# Patient Record
Sex: FEMALE | Race: WHITE | NOT HISPANIC OR LATINO | Employment: PART TIME | URBAN - METROPOLITAN AREA
[De-identification: names, ages, dates, MRNs, and addresses within clinical notes are randomized per-mention and may not be internally consistent; named-entity substitution may affect disease eponyms.]

---

## 2019-07-10 ENCOUNTER — TELEPHONE (OUTPATIENT)
Dept: NEUROLOGY | Facility: CLINIC | Age: 69
End: 2019-07-10

## 2019-07-10 NOTE — TELEPHONE ENCOUNTER
----- Message from Gerda Lambert sent at 7/10/2019 11:22 AM CDT -----  Contact: patient  Patient called to schedule an appointment specifically with Dr Long  And wishes to speak with a nurse regarding this matter.       she can be reached at [phone]    Thanks  KB

## 2019-07-10 NOTE — TELEPHONE ENCOUNTER
Returned pt call to discuss message; pt reporting multitude of symptoms include BLE pain with numbness and tingling to feet; notified that referral not received nor in system at this time but would contact her when available. Verbalized understanding.

## 2019-08-07 ENCOUNTER — TELEPHONE (OUTPATIENT)
Dept: NEUROLOGY | Facility: CLINIC | Age: 69
End: 2019-08-07

## 2019-08-07 NOTE — TELEPHONE ENCOUNTER
Returned pt call and scheduled appt from referral with Dr. Batista from Dr. Martínez for idiopathic progressive neuropathy; instructed pt to obtain all outside records and bring to appt including imaging on disc if possible; date/time/location confirmed; verbalized understanding; appt slip mailed.

## 2019-08-07 NOTE — TELEPHONE ENCOUNTER
----- Message from Chari Read sent at 8/6/2019  2:37 PM CDT -----  Contact: Marilin orr  Type: Needs Medical Advice    Who Called:  Marilin Malloy Call Back Number: [phone]  Additional Information: Pt's  faxed a referral for pt to see Ashley Batista. The referral was sent over last Thursday. Pls call pt to adv of a status.

## 2019-09-23 ENCOUNTER — LAB VISIT (OUTPATIENT)
Dept: LAB | Facility: HOSPITAL | Age: 69
End: 2019-09-23
Attending: PSYCHIATRY & NEUROLOGY
Payer: MEDICARE

## 2019-09-23 ENCOUNTER — OFFICE VISIT (OUTPATIENT)
Dept: NEUROLOGY | Facility: CLINIC | Age: 69
End: 2019-09-23
Payer: MEDICARE

## 2019-09-23 VITALS
RESPIRATION RATE: 20 BRPM | DIASTOLIC BLOOD PRESSURE: 86 MMHG | HEIGHT: 66 IN | HEART RATE: 67 BPM | BODY MASS INDEX: 24.98 KG/M2 | WEIGHT: 155.44 LBS | SYSTOLIC BLOOD PRESSURE: 161 MMHG

## 2019-09-23 DIAGNOSIS — M79.605 LEFT LEG PAIN: ICD-10-CM

## 2019-09-23 DIAGNOSIS — M79.605 LEFT LEG PAIN: Primary | ICD-10-CM

## 2019-09-23 DIAGNOSIS — G60.9 IDIOPATHIC SMALL FIBER PERIPHERAL NEUROPATHY: ICD-10-CM

## 2019-09-23 DIAGNOSIS — R22.2 SUPRACLAVICULAR MASS: ICD-10-CM

## 2019-09-23 DIAGNOSIS — M51.36 DEGENERATIVE DISC DISEASE, LUMBAR: ICD-10-CM

## 2019-09-23 DIAGNOSIS — R22.1 NECK MASS: ICD-10-CM

## 2019-09-23 LAB
ALBUMIN SERPL BCP-MCNC: 4 G/DL (ref 3.5–5.2)
ALP SERPL-CCNC: 58 U/L (ref 55–135)
ALT SERPL W/O P-5'-P-CCNC: 20 U/L (ref 10–44)
ANION GAP SERPL CALC-SCNC: 12 MMOL/L (ref 8–16)
AST SERPL-CCNC: 24 U/L (ref 10–40)
BILIRUB SERPL-MCNC: 0.7 MG/DL (ref 0.1–1)
BUN SERPL-MCNC: 14 MG/DL (ref 8–23)
CALCIUM SERPL-MCNC: 9.3 MG/DL (ref 8.7–10.5)
CHLORIDE SERPL-SCNC: 105 MMOL/L (ref 95–110)
CK SERPL-CCNC: 55 U/L (ref 20–180)
CO2 SERPL-SCNC: 20 MMOL/L (ref 23–29)
CREAT SERPL-MCNC: 0.6 MG/DL (ref 0.5–1.4)
CRP SERPL-MCNC: 0.13 MG/DL (ref 0–0.75)
ERYTHROCYTE [SEDIMENTATION RATE] IN BLOOD BY WESTERGREN METHOD: 11 MM/HR (ref 0–20)
EST. GFR  (AFRICAN AMERICAN): >60 ML/MIN/1.73 M^2
EST. GFR  (NON AFRICAN AMERICAN): >60 ML/MIN/1.73 M^2
GLUCOSE SERPL-MCNC: 147 MG/DL (ref 70–110)
POTASSIUM SERPL-SCNC: 3.3 MMOL/L (ref 3.5–5.1)
PROT SERPL-MCNC: 7.2 G/DL (ref 6–8.4)
SODIUM SERPL-SCNC: 137 MMOL/L (ref 136–145)

## 2019-09-23 PROCEDURE — 99205 PR OFFICE/OUTPT VISIT, NEW, LEVL V, 60-74 MIN: ICD-10-PCS | Mod: S$PBB,,, | Performed by: PSYCHIATRY & NEUROLOGY

## 2019-09-23 PROCEDURE — 99999 PR PBB SHADOW E&M-EST. PATIENT-LVL IV: ICD-10-PCS | Mod: PBBFAC,,, | Performed by: PSYCHIATRY & NEUROLOGY

## 2019-09-23 PROCEDURE — 86038 ANTINUCLEAR ANTIBODIES: CPT

## 2019-09-23 PROCEDURE — 99214 OFFICE O/P EST MOD 30 MIN: CPT | Mod: PBBFAC,PN | Performed by: PSYCHIATRY & NEUROLOGY

## 2019-09-23 PROCEDURE — 83516 IMMUNOASSAY NONANTIBODY: CPT

## 2019-09-23 PROCEDURE — 86255 FLUORESCENT ANTIBODY SCREEN: CPT | Mod: 91

## 2019-09-23 PROCEDURE — 99999 PR PBB SHADOW E&M-EST. PATIENT-LVL IV: CPT | Mod: PBBFAC,,, | Performed by: PSYCHIATRY & NEUROLOGY

## 2019-09-23 PROCEDURE — 85651 RBC SED RATE NONAUTOMATED: CPT

## 2019-09-23 PROCEDURE — 80053 COMPREHEN METABOLIC PANEL: CPT

## 2019-09-23 PROCEDURE — 36415 COLL VENOUS BLD VENIPUNCTURE: CPT

## 2019-09-23 PROCEDURE — 86140 C-REACTIVE PROTEIN: CPT

## 2019-09-23 PROCEDURE — 82550 ASSAY OF CK (CPK): CPT

## 2019-09-23 PROCEDURE — 99205 OFFICE O/P NEW HI 60 MIN: CPT | Mod: S$PBB,,, | Performed by: PSYCHIATRY & NEUROLOGY

## 2019-09-23 PROCEDURE — 82085 ASSAY OF ALDOLASE: CPT

## 2019-09-23 RX ORDER — MIRABEGRON 50 MG/1
1 TABLET, FILM COATED, EXTENDED RELEASE ORAL DAILY
Refills: 11 | COMMUNITY
Start: 2019-07-01

## 2019-09-23 RX ORDER — IBUPROFEN 800 MG/1
800 TABLET ORAL EVERY 8 HOURS PRN
COMMUNITY

## 2019-09-23 RX ORDER — CHOLECALCIFEROL (VITAMIN D3) 25 MCG
1000 TABLET ORAL DAILY
COMMUNITY

## 2019-09-23 NOTE — PATIENT INSTRUCTIONS
Will get labs regarding left leg pain and your neuropathy.    Will get an EMG to evaluate your more recent leg pain.    Will get a CT of the neck to look at the fullness you have on the left side.

## 2019-09-23 NOTE — LETTER
September 25, 2019      Leonard Martínez MD  [address]  [place] [address]           [facility]  [address] [place] [address]  Phone: [phone]  Fax: [phone]          Patient: Marilin Betts   MR Number: [id number]   YOB: 1950   Date of Visit: 9/23/2019       Dear Dr. Leonard Martínez:    Thank you for referring Marilin Betts to me for evaluation. Attached you will find relevant portions of my assessment and plan of care.    If you have questions, please do not hesitate to call me. I look forward to following Marilin Betts along with you.    Sincerely,    Ashley Batista, DO    Enclosure  CC:  No Recipients    If you would like to receive this communication electronically, please contact [email or web address] or [phone] to request more information on [facility] access.    For providers and/or their staff who would like to refer a patient to [facility], please contact us through our one-stop-shop provider referral line, [facility], at [phone].    If you feel you have received this communication in error or would no longer like to receive these types of communications, please e-mail [email or web address]

## 2019-09-23 NOTE — PROGRESS NOTES
NEUROLOGY  Outpatient CONSULT    [facility]  [address], [place] [address]  [phone] (office) / [phone] (fax)    Patient Name:  Marilin Betts  :  1950  MR #:  [id number]  Acct #:  [id number]    Date of Neurology Consult: 2019  Name of Neurologist: Ashley Batista D.O, ABPN, AOBNP, ABEM    Other Physicians:  Leonard Martínez MD (Primary Care Physician); Leonard Martínez MD (Referring)      Chief Complaint: Extremity Weakness and Peripheral Neuropathy      History of Present Illness (HPI):  Marilin Betts is a 68 y.o. female referred by her primary physician for consultation regarding neuropathy.    She has a long history of spastic bladder. She was diagnosed with an autonomic nerve disorder in her 30s.  She was told it was a neurogenic bladder.  She also had bowel dysfunction.  She states then about 5 years ago she would get transient muscle weakness in her legs.  She described it as a strong, aching pain.  It would make her leg collapse and fall.  She saw a doctor for leg weakness, blurred vision, bowel and bladder issues. She was tested for MS and had an MRI that was clean.  Since then she has had many other tests to rule out other things.  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  She was seen by a hematologist for this.  They did not feel this was symptomatic.  She then had a skin biopsy that showed she had small fiber neuropathy.  Then she had another MRI of her low back and tried epidural injections.  These gave her about 3 weeks of relief for upper leg pain.  She was recently seen by orthopedics who found some hip arthritis, but nothing severe.  A repeat course of epidural injections did not provide any further relief.  She was seen in [place] and had an MRI brain and EMG ordered.  She cancelled those when she got this appointment.    It used to be worse in her right leg, but now her left leg is worse.  She states that she will be walking and one of  her knees will give out.  She typically can catch herself.  She has never been hurt with a fall, but has fallen.    She has a burning sensation in just the left foot that comes and goes.  This is maybe once a month for a few hours.    Her pain goes up and down her left leg, she does not currently have this in the right leg.  The pain will generally shoot through her thigh or her calf.  She has never had the whole leg involved at once, generally just one area.  She has a dull ache that is constant, the shooting pains are sporadic.    Her epidural injections madsen not affect the burning in the foot.  She has not had an epidural since the radiating pain in the leg started.     She has intermittent blurred vision, glasses help.  She states she can even have double.  She has dizziness when she stands up too fast.  She has a history of fainting.  Her blood pressures will drop.  She does not perspire.    She is here today because it is getting worse, it won't go away anymore.  It has been persistently present for the last 1.5 years.  This is interfering with her life and hobbies.    She has a swelling just medial to her left clavicle.  She also has supraclavicular fullness on the left.  She states she has discomfort when leaning her head to the left.    She does not have Celiac, but has sensitivity to wheat.    Treatment to date:    N/A    Review of Systems:   General: Weight gain: No, Weight Loss: Yes, Fatigue: No,   Fever: No, Chills: No, Night Sweats: No, Insomnia: Yes, Excessive sleeping: No   Respiratory:  Cough: No, Shortness of Breath: No,   Wheezing: No, Excessive Snoring: No, Coughing up blood: No  Endocrine: Heat Intolerance: No, Cold Intolerance: No,   Excessive Thirst: Yes, Excessive Hunger: No,   Eyes:  Blurred Vision: Yes, Double Vision: Yes,   Light Sensitivity: No, Eye pain: No  Musculoskeletal: Muscle Aches/Pain: Yes, Joint Pain/Swelling: Yes, Muscle Cramps: Yes, Muscle Weakness: Yes, Neck Pain: Yes, Back  Pain: No   Neurological: Difficulty Walking/Falls: Yes, Headache Migraine: No, Dizziness/Vertigo: Yes, Fainting: No, Difficulty with Speech: No, Weakness: Yes, Tingling/Numbness: Yes, Tremors: No, Memory Problems: Yes, Seizures: No, Difficulty Swallowing: No, Altered Taste: No.  Cardiovascular: Chest Pain: No, Shortness of Breath: No,   Palpitations: No,  Gastrointestinal: Nausea/Vomiting: No, Constipation: Yes, Diarrhea: No, Bloody Stools: No   Psych/Cog:  Depression: No, Anxiety: No, Hallucinations: No, Problems Concentrating: No  : Frequent Urination: Yes, Incontinence: Yes, Blood of Urine: Yes, Urinary Infections: No  ENT:Hearing Loss: Yes, Earache: Yes, Ringing in Ears: No,   Facial Pain: No, Chronic Congestion: No   Immune: Seasonal Allergies: No, Hives and/or Rashes: No  The remainder of the review of twelve body systems was reviewed and normal.    Past Medical, Surgical, Family & Social History:   Past Medical History:   Diagnosis Date    Blurred vision     [place] (hard of hearing)     Monoclonal gammopathy     Neuropathy     Spastic neurogenic bladder      Past Surgical History:   Procedure Laterality Date    CHOLECYSTECTOMY      HYSTERECTOMY      SKIN CANCER EXCISION       Family History   Problem Relation Age of Onset    COPD Mother     Heart disease Father     Breast cancer Sister     Diabetes Sister     Hypertension Sister     Diabetes Brother     Hypertension Brother      Alcohol use:  reports that she drinks alcohol.   (Of note, 0.6 oz = 1 beer or 6 oz = 10 beers).  Tobacco use:  reports that she has quit smoking. She has never used smokeless tobacco.  Street drug use:  reports that she does not use drugs.  Allergies: Wheat containing prod.    Home Medications:     Current Outpatient Medications:     biotin 300 mcg Tab, Take 300 mcg by mouth once daily., Disp: , Rfl:     ibuprofen (ADVIL,MOTRIN) 800 MG tablet, Take 800 mg by mouth every 8 (eight) hours as needed., Disp: , Rfl:      "MYRBETRIQ 50 mg Tb24, Take 1 tablet by mouth once daily., Disp: , Rfl: 11    vitamin D (VITAMIN D3) 1000 units Tab, Take 1,000 Units by mouth once daily., Disp: , Rfl:     Physical Examination:  BP (!) 161/86   Pulse 67   Resp 20   Ht 5' 6" (1.676 m)   Wt 70.5 kg (155 lb 6.8 oz)   BMI 25.09 kg/m²     GENERAL:  General appearance: Well, non-toxic appearing.  No apparent distress.  Fundi exam: normal.  Neck: there is mass like swelling on the anterior neck just above the sternum and over the left clavicle.  Also with left supraclavicular fullness.  Carotid auscultation: normal.  Heart auscultation: normal.  Peripheral pulses: normal.  Extremities: normal.    MENTAL STATUS:  Alertness, attention span & concentration: normal.  Language: normal.  Orientation to self, place & time:  normal.  Memory, recent & remote: normal.  Fund of knowledge: normal.    SPEECH:  Clear and fluent.  Follows complex commands.    CRANIAL NERVES:  Cranial Nerves II-XII were examined.  II - Visual fields: normal.  III, IV, VI: PERRL, EOMI, No ptosis, No nystagmus.  V - Facial sensation: normal.  VII - Face symmetry & mobility: normal.  VIII - Hearing: normal.  IX, X - Palate: mobile & midline.  XI - Shoulder shrug: normal.  XII - Tongue protrusion: normal.    GROSS MOTOR:  Gait & station: antalgic gait favoring the left leg (pain in the posterior thigh and calf).  Tone: normal.  Abnormal movements: none.  Finger-nose & Heel-knee-shin: normal.  Rapid alternating movements & drift: normal.    MUSCLE STRENGTH:     Fascics Atrophy RIGHT    LEFT Atrophy Fascics     5 Neck Ext. 5       5 Neck Flex 5       5 Deltoids 5       5 Sh.Ext.Rot. 5       5 Sh.Int.Rot. 5       5 Biceps 5       5 Triceps 5       5 Forearm.Pr. 5       5 Wrist Ext. 5       5 Wrist Flex 5       5 Finger Ext. 5       5 Finger Flex 5       5 FPL 5       5 Inteross. 5                         5 Iliopsoas 4       5 Hip Abduct 5       4 Hip Adduct 4       5 Quads 5       5 Hams 5 "       5 Dorsiflex 5       5 Plantar Flex 5       5 Ankle Eliseo 5       5 Ankle Invert 5       5 Toe Ext. 5       5 Toe Flex 5                         REFLEXES:    RIGHT Reflex   LEFT   2+ Biceps 2+   2+ Brachiorad. 2+   2+ Triceps 2+        2+ Patellar 2+   2+ Ankle 2+        Down PLANTAR Down     SENSORY:  Sharp touch: reduced in fingers of right hand.  Vibration: Normal throughout.      Diagnostic Data Reviewed:   Records reviewed.    EMG 1/20/17:  This is a normal study.  There is no electrophysiologic evidence of large   fiber peripheral neuropathy. Small fiber neuropathy is not assessed by   these measures.    MRI lumbar spine 1/18/17:  FINDINGS:  The visualized vertebral bodies are normal height, signal intensity and alignment. The visualized portions of the spinal cord are of normal caliber and signal and the conus medullaris terminates at the L1 level. The cauda equina is   unremarkable.   Level specific findings:  T12-L1: No significant neuroforaminal narrowing, or spinal canal stenosis.   L1-L2: Broad-based symmetric disc bulge with no significant neural foraminal narrowing or spinal canal stenosis.   L2-L3: Broad-based symmetric disc bulge with a central disc protrusion demonstrating increased signal suggesting an annular tear. There is hypertrophic changes of the facets and the disc which results in mild bilateral neuroforaminal narrowing.   Hypertrophic facets, ligamentum flavum infolding, prominent epidural fat and the disc result in mild spinal canal stenosis.  L3-L4: No significant neuroforaminal narrowing, or spinal canal stenosis.  L4-L5: Broad-based symmetric disc bulge with a right central/foraminal disc protrusion in combination with facet hypertrophic changes which result in moderate right and mild/moderate left neuroforaminal narrowing. Prominent ligament and flavum, epidural   fat, facet hypertrophic changes, and the disc results in mild spinal canal stenosis.  There is narrowing of the  lateral recesses bilaterally, particularly on the right where there is likely right L5 traversing nerve root compression.  L5-S1: Asymmetric leftward disc bulge and facet hypertrophy results in moderate left neuroforaminal narrowing. No significant spinal canal stenosis.    Labs 2017:  ACE - wnl  Anti-gliadin Abs - wnl  Copper - wnl  A1c 4.9  B6 wnl  TTG ab wnl    Per [facility] records from 2/9/17 she had a skin biopsy:  Biopsy results showed diminished nerve fiber density, left calf consistent with a diagnosis of small fiber neuropathy.      Assessment and Plan:  Marilin Betts is a 68 y.o., right handed woman who presents with complaints of worsening leg pain.  She has had burning in her feet for sometime.  She has been previously diagnosed with small fiber neuropathy, which may explain her neuropathic pain in her feet.  This would not explain her new radiating pain in her legs.  This symptom is much more consistent with radiculopathy or sciatica.  She has previous imaging of the lumbar spine confirming degenerative bone and disc changes.  Her EMG in 2017 (pre leg pain) was normal.  Lumbar stenosis can be associated with intermittent leg weakness and leg collapse.  Joint issues can also cause this.      Will get an updated workup including aldolase, , ANCA, CK, CMP, CRP, ESR and TTG.  Will get an updated EMG of the left leg.    She needs to talk to Dr. Martínez about her neck.  The anterior swelling and left supraclavicular fullness is concerning.  Will get a CT soft tissue of the neck to initiate her workup.    The patient will return to clinic after testing.    Important to note, also  has a past medical history of Blurred vision, [place] (hard of hearing), Monoclonal gammopathy, Neuropathy, and Spastic neurogenic bladder.          Ashley Batista D.O, ABPN, AOBNP, ABEM    This note was created with voice recognition software.  Grammatical, syntax and spelling errors may be inevitable.

## 2019-09-24 ENCOUNTER — HOSPITAL ENCOUNTER (OUTPATIENT)
Dept: RADIOLOGY | Facility: HOSPITAL | Age: 69
Discharge: HOME | End: 2019-09-24
Attending: PSYCHIATRY & NEUROLOGY
Payer: MEDICARE

## 2019-09-24 DIAGNOSIS — R22.2 SUPRACLAVICULAR MASS: ICD-10-CM

## 2019-09-24 DIAGNOSIS — R22.1 NECK MASS: ICD-10-CM

## 2019-09-24 LAB — ANA SER QL IF: NORMAL

## 2019-09-24 PROCEDURE — 70491 CT SOFT TISSUE NECK WITH CONTRAST: ICD-10-PCS | Mod: 26,,, | Performed by: RADIOLOGY

## 2019-09-24 PROCEDURE — 70491 CT SOFT TISSUE NECK W/DYE: CPT | Mod: TC

## 2019-09-24 PROCEDURE — 25500020 PHARM REV CODE 255: Performed by: PSYCHIATRY & NEUROLOGY

## 2019-09-24 PROCEDURE — 70491 CT SOFT TISSUE NECK W/DYE: CPT | Mod: 26,,, | Performed by: RADIOLOGY

## 2019-09-24 RX ADMIN — IOHEXOL 75 ML: 350 INJECTION, SOLUTION INTRAVENOUS at 09:09

## 2019-09-25 LAB — ALDOLASE SERPL-CCNC: 3.1 U/L (ref 1.2–7.6)

## 2019-09-26 ENCOUNTER — TELEPHONE (OUTPATIENT)
Dept: NEUROLOGY | Facility: CLINIC | Age: 69
End: 2019-09-26

## 2019-09-26 LAB
ANCA AB TITR SER IF: NORMAL TITER
P-ANCA TITR SER IF: NORMAL TITER
TTG IGA SER-ACNC: 7 UNITS

## 2019-09-26 NOTE — TELEPHONE ENCOUNTER
Spoke with pt and informed of CT results per  Dr. Batista:     The CT neck results reveal no concerning findings in the neck.     Verbalized understanding.

## 2019-09-26 NOTE — TELEPHONE ENCOUNTER
----- Message from Ashley Batista DO sent at 9/26/2019  5:31 AM CDT -----  The CT neck results reveal no concerning findings in the neck.

## 2019-11-18 ENCOUNTER — TELEPHONE (OUTPATIENT)
Dept: NEUROLOGY | Facility: CLINIC | Age: 69
End: 2019-11-18

## 2019-11-18 NOTE — TELEPHONE ENCOUNTER
----- Message from Arielle Thomas sent at 11/18/2019  1:03 PM CST -----  Contact: self   Patient want to speak with a nurse regarding rescheduling her EMG appointment was told to call back to reschedule please call back at [phone] (home) case number  [id number]

## 2019-11-18 NOTE — TELEPHONE ENCOUNTER
Returned call to pt and rescheduled EMG for Dr. Batista; date/time/location confirmed; verbalized understanding.

## 2019-12-18 ENCOUNTER — PROCEDURE VISIT (OUTPATIENT)
Dept: NEUROLOGY | Facility: CLINIC | Age: 69
End: 2019-12-18
Payer: MEDICARE

## 2019-12-18 DIAGNOSIS — M79.605 LEFT LEG PAIN: ICD-10-CM

## 2019-12-18 PROCEDURE — 95886 PR EMG COMPLETE, W/ NERVE CONDUCTION STUDIES, 5+ MUSCLES: ICD-10-PCS | Mod: 26,S$PBB,, | Performed by: PSYCHIATRY & NEUROLOGY

## 2019-12-18 PROCEDURE — 95886 MUSC TEST DONE W/N TEST COMP: CPT | Mod: 26,S$PBB,, | Performed by: PSYCHIATRY & NEUROLOGY

## 2019-12-18 PROCEDURE — 95908 PR NERVE CONDUCTION STUDY; 3-4 STUDIES: ICD-10-PCS | Mod: 26,S$PBB,, | Performed by: PSYCHIATRY & NEUROLOGY

## 2019-12-18 PROCEDURE — 95908 NRV CNDJ TST 3-4 STUDIES: CPT | Mod: 26,S$PBB,, | Performed by: PSYCHIATRY & NEUROLOGY

## 2019-12-18 PROCEDURE — 95908 NRV CNDJ TST 3-4 STUDIES: CPT | Mod: PBBFAC,PN | Performed by: PSYCHIATRY & NEUROLOGY

## 2019-12-18 PROCEDURE — 95886 MUSC TEST DONE W/N TEST COMP: CPT | Mod: PBBFAC,PN | Performed by: PSYCHIATRY & NEUROLOGY

## 2020-01-13 ENCOUNTER — TELEPHONE (OUTPATIENT)
Dept: NEUROLOGY | Facility: CLINIC | Age: 70
End: 2020-01-13

## 2020-01-13 NOTE — TELEPHONE ENCOUNTER
----- Message from Leonard Ruiz sent at 1/13/2020  3:15 PM CST -----  Contact: Ptnt   [phone]  Type:  Sooner Apoointment Request    Caller is requesting a sooner appointment.  Caller declined first available appointment listed below.  Caller will not accept being placed on the waitlist and is requesting a message be sent to doctor.    Name of Caller:  Ptnt   [phone]    When is the first available appointment?  Has appmnt 01-31-20     Symptoms:  F/U   Neuromuscular (Myasthenia Gravis, Carpal Tunnel Syndrome, EMG, Neuropathy, Bell's palsy, Myopathy)     Best Call Back Number: Ptnt   [phone]    Additional Information:  Advised would like to move her appmnt to 27 or 28 January 2020 or possibly 02-24-20 if possible. Please advise.

## 2020-01-13 NOTE — TELEPHONE ENCOUNTER
Spoke with pt and informed Dr. Batista not in clinic on dates requested. Attempted to reschedule pt appt. Pt states she will keep current appt and call back if needed to cancel. Verbalized understanding.

## 2020-01-20 NOTE — PROCEDURES
[facility]   [facility]  [address] [address]  [phone]             Full Name: Marilin Betts Gender: Female  Patient ID: [id number] YOB: 1950      Visit Date: 12/18/2019 08:42  Age: 69 Years 1 Months Old  Examining Physician: Ashley Batista D.O., ABPN, AOBNP, ABEM   Referring Physician: Aslhey Batista D.O.   Height: 5 feet 6 inch  History: Patient has symptoms of pain and muscle weakness that causes her to limp.  She was recently diagnosed with small fiber neuropathy.  Onset of symptoms was about 4 years ago . Pain is radiating.  Evaluate for radiculopathy in the setting of neuropathy.      Sensory NCS      Nerve / Sites Rec. Site Onset Lat Peak Lat NP Amp Segments Distance Velocity Temp.     ms ms µV  cm m/s °C   L Sural - Ankle (Calf)      Calf Ankle 3.39 4.27 19.4 Calf - Ankle 14 41 36      Ref.   ?4.60 ?3.0 Ref.  ?40    L Superficial peroneal - Ankle      Lat leg Ankle 2.81 3.54 13.6 Lat leg - Ankle 12 43 36      Ref.   ?4.60 ?3.0 Ref.          Motor NCS      Nerve / Sites Muscle Latency Ref. Amplitude Ref. Amp % Duration Segments Distance Lat Diff Velocity Ref. Temp.     ms ms mV mV % ms  cm ms m/s m/s °C   L Peroneal - EDB      Ankle EDB 4.95 ?6.00 3.4 ?2.5 100 6.82 Ankle - EDB     36      Fib head EDB 11.93  3.2  92.4 8.18 Fib head - Ankle 31.5 6.98 45 ?40 36      Pop fossa EDB 15.42  2.9  85.4 8.07 Pop fossa - Fib head 11 3.49 32  36   L Tibial - AH      Ankle AH 4.64 ?6.00 20.3 ?2.5 100 6.72 Ankle - AH     36      Pop fossa AH 14.64  16.3  80.1 7.29 Pop fossa - Ankle 43 10.00 43 ?40 36       EMG Summary Table     Spontaneous Recruitment Activation Duration Amplitude Polyphasia Comment   Muscle Ins Act Fib Fasc Pattern - - - - -   L. Tibialis anterior Normal 0 0 Normal Normal Normal Normal Normal Normal   L. Gastrocnemius (Medial head) Normal 0 0 Normal Normal Normal Normal Normal Normal   L. Extensor hallucis longus  Normal 0 0 Normal Normal Normal Normal Normal Normal   L. Peroneus longus Normal 0 0 Normal Normal Normal Normal Normal Normal   L. Vastus medialis Normal 0 0 Normal Normal Normal Normal Normal Normal   L. Vastus lateralis Normal 0 0 Normal Normal Normal Normal Normal Normal   L. Tensor fasciae latae Normal 0 0 Normal Normal Normal Normal Normal Normal   L. Iliopsoas Normal 0 0 Early Normal Normal Normal N/+1 Normal   L. Lumbar paraspinals Normal 0 0      Normal         Summary:  Nerve conduction studies were performed on the left lower extremity.  Left sural and superficial peroneal sensory responses were normal in amplitude and latency.  Left peroneal motor response was normal in amplitude and latency with slowing of the velocity across the knee.  The left tibial motor study was normal in amplitude, latency and velocity.  Needle EMG was performed in the left lower extremity and lumbar paraspinal muscles.  No active denervation was present in any muscle tested.  Motor units were polyphasic in the left iliopsoas with slightly early recruitment.  Not clearly myopathic.  All other motor unit morphology and recruitment patterns were normal.    Impression: This is a borderline abnormal EMG of the left lower extremity.  The findings are as follows:  1. There are findings suggestive, but not diagnostic of a very mild, left peroneal neuropathy at the knee without active denervation.  This could be a previous injury that has recovered.  2. There are findings suggestive, but not diagnostic of a proximal myopathy.  Clinical correlation is recommended.  There is no evidence of peripheral neuropathy, plexopathy or radiculopathy on this study.      Thank you for referring to the [facility] in [place].  Please feel free to contact the clinic if you have any further questions regarding this study or report.         ____________________________  Ashley Batista D.O., ABPN, AOBNP, ABSACHIN

## 2020-02-10 ENCOUNTER — TELEPHONE (OUTPATIENT)
Dept: NEUROLOGY | Facility: CLINIC | Age: 70
End: 2020-02-10

## 2020-02-10 NOTE — TELEPHONE ENCOUNTER
----- Message from Stephanie Puentes sent at 2/10/2020  2:54 PM CST -----  Contact: patient  Type:  Patient Returning Call    Who Called:  patient  Who Left Message for Patient:  Nu  Does the patient know what this is regarding?:  yes  Best Call Back Number:  [phone]  Additional Information:  Requesting a call back,place call to pod

## 2020-02-10 NOTE — TELEPHONE ENCOUNTER
----- Message from Topher Mojica sent at 2/10/2020  1:44 PM CST -----  Type: Needs Medical Advice    Who Called:  Patient   Symptoms (please be specific):neuropathy/emg f/u    Best Call Back Number: [phone]  Additional Information: patient is requesting to be seen the week of 03/2/2020 through 03/06/2020. Please call back to assist.     Thank you,  Topher Mojica  Patient Access Rep, Supervisor   [phone]

## 2020-02-10 NOTE — TELEPHONE ENCOUNTER
Returned call to pt and scheduled f/u appt with Dr. Batista. DAte/time/location confirmed; verbalized understanding.

## 2020-03-27 ENCOUNTER — TELEPHONE (OUTPATIENT)
Dept: NEUROLOGY | Facility: CLINIC | Age: 70
End: 2020-03-27

## 2020-03-27 NOTE — TELEPHONE ENCOUNTER
----- Message from Song Gonzalez sent at 3/27/2020 12:16 PM CDT -----  Type: Needs Medical Advice    Who Called:  Patient    Best Call Back Number: [phone]  Additional Information: Patient states that she would like a callback regarding verifying her appointment on 04/13 due to Covid-19 concerns.

## 2020-04-13 ENCOUNTER — OFFICE VISIT (OUTPATIENT)
Dept: NEUROLOGY | Facility: CLINIC | Age: 70
End: 2020-04-13
Payer: MEDICARE

## 2020-04-13 DIAGNOSIS — G62.9 SMALL FIBER NEUROPATHY: ICD-10-CM

## 2020-04-13 DIAGNOSIS — M79.10 MYALGIA: Primary | ICD-10-CM

## 2020-04-13 PROCEDURE — 99214 PR OFFICE/OUTPT VISIT, EST, LEVL IV, 30-39 MIN: ICD-10-PCS | Mod: 95,,, | Performed by: PSYCHIATRY & NEUROLOGY

## 2020-04-13 PROCEDURE — 99214 OFFICE O/P EST MOD 30 MIN: CPT | Mod: 95,,, | Performed by: PSYCHIATRY & NEUROLOGY

## 2020-04-13 RX ORDER — AMITRIPTYLINE HYDROCHLORIDE 10 MG/1
20 TABLET, FILM COATED ORAL NIGHTLY
Qty: 60 TABLET | Refills: 2 | Status: SHIPPED | OUTPATIENT
Start: 2020-04-13 | End: 2020-07-20

## 2020-04-13 RX ORDER — MELOXICAM 15 MG/1
15 TABLET ORAL DAILY
Qty: 30 TABLET | Refills: 2 | Status: SHIPPED | OUTPATIENT
Start: 2020-04-13 | End: 2020-07-20

## 2020-04-13 NOTE — PROGRESS NOTES
NEUROLOGY  Outpatient Tele-Follow Up    [facility]  [address] [facility], [place] [address]  [phone] (office) / [phone] (fax)    Patient Name:  Marilin Betts  :  1950  MR #:  [id number]  Acct #:  [id number]    Date of Neurology Visit: 2020  Name of Neurologist: Ashley Batista D.O, ABPN, AOBNP, ABEM    Other Physicians:  Leonard Martínez MD (Primary Care Physician); No ref. provider found (Referring)      Chief Complaint: Leg Pain      History of Present Illness (HPI):  Marilin Betts is a 69 y.o. female here for follow up of her leg pain.    She saw her doctor for her swollen lymph nodes.  The CT we did was negative but she will be getting another scan the end of this month.    She states her legs are getting worse.  Her left leg is painful.  It is getting difficult to walk.  It is hard to lift her leg to get in and out of the car.  She states it hurts when she tries to pick it up.  She states it can be an achy pain, but sometimes it is a shooting pain.  It will vary some.  The pain is better when she is just seated and relaxed.  She states a busy day will cause some increased pain at night, but it will resolve while she sleeps and she feels better in the morning.    Her small fiber neuropathy pain is in her feet.  This waxes and wanes     She saw a rheumatologist several years ago.  She had blood work with them, this workup was normal.      She does not sleep well at night.  She can be awakened by pain or her bladder.  She does not get too tired during the day. She does not nap.  She can feel rested in the morning, she is used to not getting a lot of sleep.    Initial HPI:  Marilin Betts is a 68 y.o. female referred by her primary physician for consultation regarding neuropathy.  She has a long history of spastic bladder. She was diagnosed with an autonomic nerve disorder in her 30s.  She was told it was a neurogenic bladder.  She also had bowel dysfunction.  She  states then about 5 years ago she would get transient muscle weakness in her legs.  She described it as a strong, aching pain.  It would make her leg collapse and fall.  She saw a doctor for leg weakness, blurred vision, bowel and bladder issues. She was tested for MS and had an MRI that was clean.  Since then she has had many other tests to rule out other things.  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  She was seen by a hematologist for this.  They did not feel this was symptomatic.  She then had a skin biopsy that showed she had small fiber neuropathy.  Then she had another MRI of her low back and tried epidural injections.  These gave her about 3 weeks of relief for upper leg pain.  She was recently seen by orthopedics who found some hip arthritis, but nothing severe.  A repeat course of epidural injections did not provide any further relief.  She was seen in [place] and had an MRI brain and EMG ordered.  She cancelled those when she got this appointment.     It used to be worse in her right leg, but now her left leg is worse.  She states that she will be walking and one of her knees will give out.  She typically can catch herself.  She has never been hurt with a fall, but has fallen.     She has a burning sensation in just the left foot that comes and goes.  This is maybe once a month for a few hours.     Her pain goes up and down her left leg, she does not currently have this in the right leg.  The pain will generally shoot through her thigh or her calf.  She has never had the whole leg involved at once, generally just one area.  She has a dull ache that is constant, the shooting pains are sporadic.     Her epidural injections madsen not affect the burning in the foot.  She has not had an epidural since the radiating pain in the leg started.      She has intermittent blurred vision, glasses help.  She states she can even have double.  She has dizziness when she stands up too fast.  She has a history of  fainting.  Her blood pressures will drop.  She does not perspire.     She is here today because it is getting worse, it won't go away anymore.  It has been persistently present for the last 1.5 years.  This is interfering with her life and hobbies.     She has a swelling just medial to her left clavicle.  She also has supraclavicular fullness on the left.  She states she has discomfort when leaning her head to the left.     She does not have Celiac, but has sensitivity to wheat.      Treatment to date:   N/A    Review of Systems:    Review of Systems   Constitutional: Negative for chills and fever.   Eyes: Negative for blurred vision and double vision.   Genitourinary: Positive for frequency and urgency.   Musculoskeletal: Positive for back pain, joint pain and myalgias.   Neurological: Positive for tingling and focal weakness.   Psychiatric/Behavioral: The patient has insomnia.         Past Medical, Surgical, Family & Social History:   Reviewed and updated.    Home Medications:     Current Outpatient Medications:     biotin 300 mcg Tab, Take 300 mcg by mouth once daily., Disp: , Rfl:     ibuprofen (ADVIL,MOTRIN) 800 MG tablet, Take 800 mg by mouth every 8 (eight) hours as needed., Disp: , Rfl:     MYRBETRIQ 50 mg Tb24, Take 1 tablet by mouth once daily., Disp: , Rfl: 11    vitamin D (VITAMIN D3) 1000 units Tab, Take 1,000 Units by mouth once daily., Disp: , Rfl:     Physical Examination:  Virtual Visit    GENERAL:  General appearance: Well, non-toxic appearing.  No apparent distress.  Extremities: normal.    MENTAL STATUS:  Alertness, attention span & concentration: normal.  Language: normal.  Orientation to self, place & time:  normal.  Memory, recent & remote: normal.  Fund of knowledge: normal.  MMSE:    SPEECH:  Clear and fluent.  Follows complex commands.    CRANIAL NERVES:  Cranial Nerves II-XII were examined.  II - Visual fields: normal.  III, IV, VI: PERRL, EOMI, No ptosis, No nystagmus.  V - Facial  sensation: normal.  VII - Face symmetry & mobility: normal.  VIII - Hearing: normal.  IX, X - Palate: mobile & midline.  XI - Shoulder shrug: normal.  XII - Tongue protrusion: normal.    GROSS MOTOR:  Gait & station: normal.  Tone: normal.  Abnormal movements: none.  Finger-nose & Heel-knee-shin: normal.  Rapid alternating movements & drift: normal.  Romberg: absent    MUSCLE STRENGTH:     Fascics Atrophy RIGHT    LEFT Atrophy Fascics     5 Deltoids 5       5 Biceps 5       5 Triceps 5       5 Forearm.Pr. 5       5 Inteross. 5                         5 Iliopsoas 5       5 Quads 5       5 Hams 5       5 Dorsiflex 5       5 Plantar Flex 5       REFLEXES:    RIGHT Reflex   LEFT   2+ Biceps 2+   2+ Brachiorad. 2+   2+ Triceps 2+        2+ Patellar 2+   2+ Ankle 2+        Down PLANTAR Down     SENSORY:  Light touch: Normal throughout.  Sharp touch: Normal throughout.  Vibration: Normal throughout.      Diagnostic Data Reviewed:   Records reviewed.     EMG 1/20/17:  This is a normal study.  There is no electrophysiologic evidence of large   fiber peripheral neuropathy. Small fiber neuropathy is not assessed by   these measures.     MRI lumbar spine 1/18/17:  FINDINGS:  The visualized vertebral bodies are normal height, signal intensity and alignment. The visualized portions of the spinal cord are of normal caliber and signal and the conus medullaris terminates at the L1 level. The cauda equina is   unremarkable.   Level specific findings:  T12-L1: No significant neuroforaminal narrowing, or spinal canal stenosis.   L1-L2: Broad-based symmetric disc bulge with no significant neural foraminal narrowing or spinal canal stenosis.   L2-L3: Broad-based symmetric disc bulge with a central disc protrusion demonstrating increased signal suggesting an annular tear. There is hypertrophic changes of the facets and the disc which results in mild bilateral neuroforaminal narrowing.   Hypertrophic facets, ligamentum flavum infolding,  prominent epidural fat and the disc result in mild spinal canal stenosis.  L3-L4: No significant neuroforaminal narrowing, or spinal canal stenosis.  L4-L5: Broad-based symmetric disc bulge with a right central/foraminal disc protrusion in combination with facet hypertrophic changes which result in moderate right and mild/moderate left neuroforaminal narrowing. Prominent ligament and flavum, epidural   fat, facet hypertrophic changes, and the disc results in mild spinal canal stenosis.  There is narrowing of the lateral recesses bilaterally, particularly on the right where there is likely right L5 traversing nerve root compression.  L5-S1: Asymmetric leftward disc bulge and facet hypertrophy results in moderate left neuroforaminal narrowing. No significant spinal canal stenosis.     Labs 2017:  ACE - wnl  Anti-gliadin Abs - wnl  Copper - wnl  A1c 4.9  B6 wnl  TTG ab wnl     Per [facility] records from 2/9/17 she had a skin biopsy:  Biopsy results showed diminished nerve fiber density, left calf consistent with a diagnosis of small fiber neuropathy.     CT soft tissue neck 9/23/19:  1. No CT abnormality of the anterior left chest at the patient's reported palpable finding.  2. No significant lymphadenopathy.  3. Small bilateral thyroid nodules.  Further evaluation with thyroid ultrasound as deemed clinically necessary.  4. Opacification of right mastoid air cells consistent with acute mastoiditis in the appropriate clinical scenario.  This report was flagged in Epic as abnormal.    EMG 12/18/19:  This is a borderline abnormal EMG of the left lower extremity.  The findings are as follows:  1. There are findings suggestive, but not diagnostic of a very mild, left peroneal neuropathy at the knee without active denervation.  This could be a previous injury that has recovered.  2. There are findings suggestive, but not diagnostic of a proximal myopathy.  Clinical correlation is recommended.  There is no evidence of peripheral  neuropathy, plexopathy or radiculopathy on this study.     Component      Latest Ref Rng & Units 9/23/2019   Sodium      136 - 145 mmol/L 137   Potassium      3.5 - 5.1 mmol/L 3.3 (L)   Chloride      95 - 110 mmol/L 105   CO2      23 - 29 mmol/L 20 (L)   Glucose      70 - 110 mg/dL 147 (H)   BUN, Bld      8 - 23 mg/dL 14   Creatinine      0.5 - 1.4 mg/dL 0.6   Calcium      8.7 - 10.5 mg/dL 9.3   PROTEIN TOTAL      6.0 - 8.4 g/dL 7.2   Albumin      3.5 - 5.2 g/dL 4.0   BILIRUBIN TOTAL      0.1 - 1.0 mg/dL 0.7   Alkaline Phosphatase      55 - 135 U/L 58   AST      10 - 40 U/L 24   ALT      10 - 44 U/L 20   Anion Gap      8 - 16 mmol/L 12   eGFR if African American      >60 mL/min/1.73 m:2 >60.0   eGFR if non African American      >60 mL/min/1.73 m:2 >60.0   Cytoplasmic Neutrophilic Ab      <1:20 Titer <1:20   Perinuclear (P-ANCA)      <1:20 Titer <1:20   Sed Rate      0 - 20 mm/Hr 11   CRP      0.00 - 0.75 mg/dL 0.13   CPK      20 - 180 U/L 55   Aldolase      1.2 - 7.6 U/L 3.1    Screen      Negative <1:160 Negative <1:160   TTG IgA      <20 UNITS 7         Assessment and Plan:  Marilin Betts is a 68 y.o., right handed woman who presents with complaints of worsening leg pain.  She has had burning in her feet for some time.  She has been previously diagnosed with small fiber neuropathy, which may explain her neuropathic pain in her feet.  This would not explain her new radiating pain in her legs.  This symptom is much more consistent with radiculopathy or sciatica.  She has previous imaging of the lumbar spine confirming degenerative bone and disc changes.  Her EMG in 2017 (pre leg pain) was normal.  Lumbar stenosis can be associated with intermittent leg weakness and leg collapse.  Joint issues can also cause this.       Her workup has been normal.  This may be a type of fibromyalgia syndrome.  The good news is this is very manageable.  Most primary care and rheumatologic physicians manage this condition, but will  try the following protocol as it may be all she needs.    New Medications:  1. Meloxicam 15mg daily.  This is a long acting anti-inflammatory.  Do not take tylenol or ibuprofen with this.  15mg is the maximum dose, she can feel free to cut back to 7.5 if desired.    2. Amitriptyline 10mg nightly for a week, then 20mg nightly.  Take this 8-10 hours before getting up in the morning.  It has a sedating side effect which is great for improving continuous sleep. It can have a side effect of urinary retention which will be beneficial in her case.  It can also cause dry mouth so watch for this.  This medication can be adjusted up or down as needed to reduce pain.  20mg is just a good starting point.  This can have an added benefit of helping the small fiber neuropathy pain in her feet.    Physical therapy can be very beneficial.  Just start slow and know that initially the pain may increase before it gets better.    The patient will return to clinic in 3 months.    Important to note, also  has a past medical history of Blurred vision, [place] (hard of hearing), Monoclonal gammopathy, Neuropathy, and Spastic neurogenic bladder.    This note was created with voice recognition software.  Grammatical, syntax and spelling errors may be inevitable.      Ashley Batista D.O, ABPN, AOBNP, ABEM    The patient location is: home  The chief complaint leading to consultation is:   Visit type: Virtual visit with synchronous audio and video  Total time spent with patient: 33min  Each patient to whom he or she provides medical services by telemedicine is:  (1) informed of the relationship between the physician and patient and the respective role of any other health care provider with respect to management of the patient; and (2) notified that he or she may decline to receive medical services by telemedicine and may withdraw from such care at any time.

## 2020-04-13 NOTE — PATIENT INSTRUCTIONS
Your workup has been normal.  This may be a type of fibromyalgia syndrome.  The good news is this is very manageable.  Most primary care and rheumatologic physicians manage this condition, but will try the following protocol as it may be all you need.    New Medications:  1. Meloxicam 15mg daily.  This is a long acting anti-inflammatory.  Do not take tylenol or ibuprofen with this.  15mg is the maximum dose, you can feel free to cut back to 7.5 if desired.    2. Amitriptyline 10mg nightly for a week, then 20mg nightly.  Take this 8-10 hours before you want to get up in the morning.  It has a sedating side effect which is great for improving continuous sleep. It can have a side effect of urinary retention which will be beneficial in your case.  It can also cause dry mouth so watch for this.  This medication can be adjusted up or down as needed to reduce pain.  20mg is just a good starting point.  This can have the added benefit of helping the small fiber neuropathy pain in your feet.    Contact Dr. Batista's office if there are any issues with these medications.    Physical therapy can be very beneficial.  Just start slow and know that initially the pain may increase before it gets better.    Test results:  CT neck - no abnormal lymph nodes or masses; there were some thyroid nodules  EMG - some mild myopathy, no signs of sciatica or pinched nerve in the back.  Labs - your muscle enzymes is normal, no deficiencies are abnormalities to explain your symptoms.

## 2020-07-01 ENCOUNTER — TELEPHONE (OUTPATIENT)
Dept: RESEARCH | Facility: HOSPITAL | Age: 70
End: 2020-07-01

## 2020-07-01 NOTE — TELEPHONE ENCOUNTER
..2020.220 VIrtual Visit, PI Dr. Batista        I spoke with Arleth the phone. We discussed the study in detail, including the purpose, numbers/length, procedures, risk/benefit, cost/payment, contacts (investigators/IRB), alternatives/voluntary nature/withdrawal, confidentiality/HIPPA. Dr. Batista was available for questions. She verbalized understanding. She verbally consented and completed the study survey. The survey is scanned to her chart.

## 2020-07-18 DIAGNOSIS — M79.10 MYALGIA: ICD-10-CM

## 2020-07-20 RX ORDER — AMITRIPTYLINE HYDROCHLORIDE 10 MG/1
20 TABLET, FILM COATED ORAL NIGHTLY
Qty: 60 TABLET | Refills: 2 | Status: SHIPPED | OUTPATIENT
Start: 2020-07-20 | End: 2020-08-17

## 2020-07-20 RX ORDER — MELOXICAM 15 MG/1
TABLET ORAL
Qty: 30 TABLET | Refills: 2 | Status: SHIPPED | OUTPATIENT
Start: 2020-07-20 | End: 2020-11-19

## 2020-11-18 DIAGNOSIS — M79.10 MYALGIA: ICD-10-CM

## 2020-11-19 RX ORDER — MELOXICAM 15 MG/1
TABLET ORAL
Qty: 30 TABLET | Refills: 0 | Status: SHIPPED | OUTPATIENT
Start: 2020-11-19